# Patient Record
Sex: MALE | Race: BLACK OR AFRICAN AMERICAN | NOT HISPANIC OR LATINO | Employment: FULL TIME | URBAN - METROPOLITAN AREA
[De-identification: names, ages, dates, MRNs, and addresses within clinical notes are randomized per-mention and may not be internally consistent; named-entity substitution may affect disease eponyms.]

---

## 2017-11-13 ENCOUNTER — OFFICE VISIT (OUTPATIENT)
Dept: INTERNAL MEDICINE | Facility: CLINIC | Age: 52
End: 2017-11-13
Payer: COMMERCIAL

## 2017-11-13 VITALS
TEMPERATURE: 98 F | HEART RATE: 92 BPM | HEIGHT: 66 IN | BODY MASS INDEX: 37.74 KG/M2 | OXYGEN SATURATION: 97 % | RESPIRATION RATE: 97 BRPM | WEIGHT: 234.81 LBS | DIASTOLIC BLOOD PRESSURE: 74 MMHG | SYSTOLIC BLOOD PRESSURE: 136 MMHG

## 2017-11-13 DIAGNOSIS — N52.9 ERECTILE DYSFUNCTION, UNSPECIFIED ERECTILE DYSFUNCTION TYPE: Primary | ICD-10-CM

## 2017-11-13 LAB — TESTOST SERPL-MCNC: 143 NG/DL

## 2017-11-13 PROCEDURE — 99213 OFFICE O/P EST LOW 20 MIN: CPT | Mod: S$GLB,,, | Performed by: FAMILY MEDICINE

## 2017-11-13 PROCEDURE — 84403 ASSAY OF TOTAL TESTOSTERONE: CPT

## 2017-11-13 PROCEDURE — 99999 PR PBB SHADOW E&M-EST. PATIENT-LVL IV: CPT | Mod: PBBFAC,,, | Performed by: FAMILY MEDICINE

## 2017-11-13 RX ORDER — SILDENAFIL 100 MG/1
100 TABLET, FILM COATED ORAL DAILY PRN
Qty: 5 TABLET | Refills: 5 | Status: SHIPPED | OUTPATIENT
Start: 2017-11-13 | End: 2018-11-13

## 2017-11-13 NOTE — PROGRESS NOTES
Subjective:       Patient ID: Harriett Chandler Jr is a 52 y.o. male.    Chief Complaint: Annual Exam and Erectile Dysfunction    He had a recent physical exam and lab studies done at work.  He reports results were all normal.  He has had 6+ months of intermittent erectile dysfunction.  He is interested in a testosterone level being done.  He denies any changes in his life.  He denies any fatigue or Stress.  He is not on any current medications.  He denies any urinary symptoms including hesitancy urgency hematuria.      Erectile Dysfunction   Irritative symptoms do not include frequency or urgency. Pertinent negatives include no dysuria or hematuria.     Review of Systems   Constitutional: Negative for fatigue and fever.   Respiratory: Negative for cough and shortness of breath.    Cardiovascular: Negative for chest pain and palpitations.   Gastrointestinal: Negative for abdominal pain.   Genitourinary: Negative for difficulty urinating, discharge, dysuria, frequency, hematuria, testicular pain and urgency.       Objective:      Physical Exam   Constitutional: He appears well-developed and well-nourished. No distress.   Cardiovascular: Normal rate, regular rhythm and normal heart sounds.    No murmur heard.  Pulmonary/Chest: Effort normal and breath sounds normal.   Abdominal: Soft. Bowel sounds are normal. He exhibits no mass. There is no tenderness.       No results found for any previous visit.     Assessment:       1. Erectile dysfunction, unspecified erectile dysfunction type        Plan:   Lab was ordered.  Hold Viagra prescription until lab is reported.      Erectile dysfunction, unspecified erectile dysfunction type  -     Testosterone  -     sildenafil (VIAGRA) 100 MG tablet; Take 1 tablet (100 mg total) by mouth daily as needed for Erectile Dysfunction.  Dispense: 5 tablet; Refill: 5

## 2017-11-14 DIAGNOSIS — E34.9 TESTOSTERONE DEFICIENCY: Primary | ICD-10-CM

## 2017-11-15 ENCOUNTER — TELEPHONE (OUTPATIENT)
Dept: INTERNAL MEDICINE | Facility: CLINIC | Age: 52
End: 2017-11-15

## 2017-11-15 NOTE — TELEPHONE ENCOUNTER
----- Message from Tati Hartmann sent at 11/15/2017 11:52 AM CST -----  Contact: [phone]   Returning call, please call back at [phone]. Thx-AH

## 2017-11-16 ENCOUNTER — TELEPHONE (OUTPATIENT)
Dept: INTERNAL MEDICINE | Facility: CLINIC | Age: 52
End: 2017-11-16

## 2017-11-16 NOTE — TELEPHONE ENCOUNTER
----- Message from Kay Starr sent at 11/16/2017  2:07 PM CST -----  Contact: Patient  Patient called to speak with the nurse; he stated that the insurance wasn't going to cover the prescription for his testosterone medication and the pharmacy sent it back to the doctor to try to get it approved. He is checking the status of that.    [facility] [address] [place] [address] [address] [facility]  [address] [facility] [address]  Phone: [phone] Fax: [phone]    He can be contacted at [phone].    Thanks,  Kay

## 2017-11-21 ENCOUNTER — TELEPHONE (OUTPATIENT)
Dept: INTERNAL MEDICINE | Facility: CLINIC | Age: 52
End: 2017-11-21

## 2017-11-21 NOTE — TELEPHONE ENCOUNTER
----- Message from Jolynn Carbone sent at 11/21/2017  3:17 PM CST -----  Pt was calling for a Madhavi back.please call pt back at [phone].

## 2017-11-22 ENCOUNTER — TELEPHONE (OUTPATIENT)
Dept: INTERNAL MEDICINE | Facility: CLINIC | Age: 52
End: 2017-11-22

## 2017-11-22 NOTE — TELEPHONE ENCOUNTER
----- Message from Vani Whitney sent at 11/22/2017  2:26 PM CST -----  called  status of rx..[phone] (home)

## 2017-11-22 NOTE — TELEPHONE ENCOUNTER
----- Message from Vani Whitney sent at 11/22/2017  2:26 PM CST -----  called  status of rx..[phone] (home)

## 2017-11-27 ENCOUNTER — TELEPHONE (OUTPATIENT)
Dept: INTERNAL MEDICINE | Facility: CLINIC | Age: 52
End: 2017-11-27

## 2017-11-27 NOTE — TELEPHONE ENCOUNTER
----- Message from Claire Montana sent at 11/27/2017  1:11 PM CST -----  Contact: Pt.  Call pt re: prescription change- Has been trying for two weeks and not been able to speak to anyone and no one returns call.  Phone # [phone].

## 2017-11-27 NOTE — TELEPHONE ENCOUNTER
He was calling in reference to the testosterone   Was it approved through the insurance  Or is Dr Coon going to change it to a different kind

## 2017-11-27 NOTE — TELEPHONE ENCOUNTER
----- Message from Lupis Wade sent at 11/27/2017 10:00 AM CST -----  Contact: [phone]  Would like to consult with nurse about script changes; pt did not give details; asked to have nurse call bk at [phone] thx lj

## 2017-11-29 ENCOUNTER — TELEPHONE (OUTPATIENT)
Dept: INTERNAL MEDICINE | Facility: CLINIC | Age: 52
End: 2017-11-29

## 2017-11-29 NOTE — TELEPHONE ENCOUNTER
Called and spoke with the patient, explained that Madhavi has been working on it.  Waiting on a reply from insurance

## 2017-11-29 NOTE — TELEPHONE ENCOUNTER
----- Message from Kay Starr sent at 11/29/2017  1:02 PM CST -----  Contact: Patient  Patient called and stated he has been waiting for a few weeks to find out about a prescription. He also left a message on Monday for a return call to find out about his medication and no one has called him back. He would like a call back to [phone].    Thanks,  Kya

## 2017-11-30 ENCOUNTER — TELEPHONE (OUTPATIENT)
Dept: INTERNAL MEDICINE | Facility: CLINIC | Age: 52
End: 2017-11-30

## 2017-11-30 NOTE — TELEPHONE ENCOUNTER
Spoke with pharmacy, states that the Testosterone powder in 25,100 and 500 gram that does not need a PA. Please advise.

## 2017-12-04 NOTE — TELEPHONE ENCOUNTER
I don't know if the powder will work or not  but if he wants to try it we can then lillian testosterone level in 6wk

## 2017-12-05 ENCOUNTER — TELEPHONE (OUTPATIENT)
Dept: INTERNAL MEDICINE | Facility: CLINIC | Age: 52
End: 2017-12-05

## 2017-12-05 NOTE — TELEPHONE ENCOUNTER
----- Message from Jolynn Carbone sent at 12/5/2017  2:42 PM CST -----  Please call pt back at [phone]. Pt miss call.

## 2017-12-05 NOTE — TELEPHONE ENCOUNTER
----- Message from Gwen Hawk sent at 12/5/2017  1:25 PM CST -----  Contact: Pt   Pt called and stated he needed to speak to the nurse.   He stated that his prescription has not been called in to the pharmacy.      [facility] [address] [place], [place] - [address] [facility]  [address] [facility] [address]  Phone: [phone] Fax: [phone]    He can be reached at [phone].  Thanks,  TF

## 2017-12-05 NOTE — TELEPHONE ENCOUNTER
Informed pt via VM, that a PA has been initiated, the powder would not be beneficial. To call back with any questions.

## 2017-12-12 ENCOUNTER — TELEPHONE (OUTPATIENT)
Dept: INTERNAL MEDICINE | Facility: CLINIC | Age: 52
End: 2017-12-12

## 2017-12-12 DIAGNOSIS — E34.9 TESTOSTERONE DEFICIENCY: Primary | ICD-10-CM

## 2017-12-12 NOTE — TELEPHONE ENCOUNTER
tried calling pt, he needs to have another testosterone lab done in order to finish the PA. vm full unable to leave message.

## 2017-12-12 NOTE — TELEPHONE ENCOUNTER
Spoke with pt, informed to come in and get another testosterone test done. Verbalized understanding.

## 2017-12-12 NOTE — TELEPHONE ENCOUNTER
----- Message from Kay Starr sent at 12/12/2017 11:44 AM CST -----  Contact: Patient  Patient returned call. He can be contacted at [phone].    Thanks,  Kay

## 2017-12-13 ENCOUNTER — CLINICAL SUPPORT (OUTPATIENT)
Dept: INTERNAL MEDICINE | Facility: CLINIC | Age: 52
End: 2017-12-13
Payer: COMMERCIAL

## 2017-12-13 ENCOUNTER — TELEPHONE (OUTPATIENT)
Dept: INTERNAL MEDICINE | Facility: CLINIC | Age: 52
End: 2017-12-13

## 2017-12-13 DIAGNOSIS — E34.9 TESTOSTERONE DEFICIENCY: ICD-10-CM

## 2017-12-13 PROCEDURE — 84403 ASSAY OF TOTAL TESTOSTERONE: CPT

## 2017-12-13 NOTE — TELEPHONE ENCOUNTER
Informed pt to fill out what he can and bring form back and can be faxed from here. Verbalized understanding.

## 2017-12-13 NOTE — TELEPHONE ENCOUNTER
----- Message from Dinorah Franklin sent at 12/13/2017  3:10 PM CST -----  Contact: PT   PT returning nurse call. Request call back .[phone] (ojxh)

## 2017-12-13 NOTE — TELEPHONE ENCOUNTER
----- Message from Desiree Garcia sent at 12/13/2017  2:09 PM CST -----  Contact: Patient  Patient needs to speak to Madhavi regarding his discharge papers from his visit today, please call him back at [phone]. Thank you

## 2017-12-14 LAB — TESTOST SERPL-MCNC: 182 NG/DL

## 2017-12-22 ENCOUNTER — TELEPHONE (OUTPATIENT)
Dept: INTERNAL MEDICINE | Facility: CLINIC | Age: 52
End: 2017-12-22

## 2017-12-22 NOTE — TELEPHONE ENCOUNTER
Called and spoke with the patient, the script is there at the pharmacy and it has been approved.  The patient was letting the office know

## 2017-12-22 NOTE — TELEPHONE ENCOUNTER
----- Message from Margoth Wakefield sent at 12/22/2017 11:37 AM CST -----  Contact: PT   Call pt regarding a script.    [phone] (vjyp)

## 2017-12-29 ENCOUNTER — TELEPHONE (OUTPATIENT)
Dept: INTERNAL MEDICINE | Facility: CLINIC | Age: 52
End: 2017-12-29

## 2017-12-29 NOTE — TELEPHONE ENCOUNTER
Informed pt that he would need to call his insurance company and see if they only cover a certain amount or if he could get something else that's not so expensive.

## 2017-12-29 NOTE — TELEPHONE ENCOUNTER
----- Message from Gwen Hawk sent at 12/29/2017  1:26 PM CST -----  Contact: Pt  Pt called and stated he needed to speak to the nurse. He stated that he received his prescription and it is incorrect. He can be reached at [phone].    Thanks,  TF

## 2018-01-16 ENCOUNTER — TELEPHONE (OUTPATIENT)
Dept: INTERNAL MEDICINE | Facility: CLINIC | Age: 53
End: 2018-01-16

## 2018-01-16 NOTE — TELEPHONE ENCOUNTER
----- Message from Tati Hartmann sent at 1/16/2018  9:53 AM CST -----  Contact: [phone]  Returning call, please call back at [phone].  Thx-AH

## 2018-10-16 ENCOUNTER — PATIENT OUTREACH (OUTPATIENT)
Dept: ADMINISTRATIVE | Facility: HOSPITAL | Age: 53
End: 2018-10-16

## 2018-10-16 NOTE — LETTER
October 16, 2018    Harriett Chandler   [address] [facility] [address]              Dear Harriett Washington     Our system indicates that you may be due for the following:   Health Maintenance Due   Topic    Hepatitis C Screening     Lipid Panel     TETANUS VACCINE     Colonoscopy     Influenza Vaccine        Triston Coon MD would like you to schedule an appointment for an annual exam at your earliest convenience. If you have completed any of these health maintenance requirements at an outside facility, please contact our office so we may update your health record.    If your PRIMARY CARE PHYSICIAN has changed, please let us know so we may update your record.    If you have any issues or need assistance in scheduling this appointment, please call the number below.     Thank you for choosing [facility] for all your health care needs.   Amalia SUTHERLAND LPN Care Coordinator  [facility]  [phone]

## 2019-04-01 ENCOUNTER — PATIENT OUTREACH (OUTPATIENT)
Dept: ADMINISTRATIVE | Facility: HOSPITAL | Age: 54
End: 2019-04-01

## 2019-05-14 DIAGNOSIS — Z12.11 COLON CANCER SCREENING: ICD-10-CM

## 2023-02-03 NOTE — TELEPHONE ENCOUNTER
----- Message from Baldev Gonzalez He was calling about his testosterone to see if it has been approved through his insurance, informed the patient that we have not heard anything yet